# Patient Record
Sex: FEMALE | Race: WHITE | Employment: OTHER | URBAN - METROPOLITAN AREA
[De-identification: names, ages, dates, MRNs, and addresses within clinical notes are randomized per-mention and may not be internally consistent; named-entity substitution may affect disease eponyms.]

---

## 2018-11-09 ENCOUNTER — HOSPITAL ENCOUNTER (OUTPATIENT)
Dept: PHYSICAL THERAPY | Age: 83
Discharge: HOME | End: 2018-11-09
Payer: MEDICARE

## 2018-11-09 PROCEDURE — 97110 THERAPEUTIC EXERCISES: CPT | Performed by: PHYSICAL THERAPIST

## 2018-11-09 PROCEDURE — G8979 MOBILITY GOAL STATUS: HCPCS | Performed by: PHYSICAL THERAPIST

## 2018-11-09 PROCEDURE — G8978 MOBILITY CURRENT STATUS: HCPCS | Performed by: PHYSICAL THERAPIST

## 2018-11-09 PROCEDURE — 97162 PT EVAL MOD COMPLEX 30 MIN: CPT | Performed by: PHYSICAL THERAPIST

## 2018-11-09 NOTE — PROGRESS NOTES
[facility] [address] [place], [address]  
[phone] fax Plan of Care/ Statement of Necessity for Physical Therapy Services Patient name: Edilma Medina Start of Care: 2018 Referral source: Krystin Luis MD : 1931 Medical Diagnosis: Dizziness and giddiness [R42] Payor: Juan Jose Laguerre / Plan: [facility] / Product Type: Medicare /  Onset Date:18 Treatment Diagnosis: Balance dysfunction. Prior Hospitalization: see medical history Provider#: [id number] Medications: Verified on Patient summary List  
 Comorbidities: Hearing impaired, Cancer, HBP, Incontinence, Osteoporosis, Stoke/TIA, Surgery in 2017. Prior Level of Function: Prior to her last surgery she was walking independently. The Plan of Care and following information is based on the information from the initial evaluation. Assessment/ key information: Patient with signs and symptoms consistent with dizziness and balance difficulty. Patient ambulates with decreased jonas and cane for assistance. Her Timed Up and Go (TUG) was 20 seconds. Rhomberg stance for 30 seconds with eyes open and 20 seconds with eyes closed. She is unable to  tandem at all. UE/Le Strength is grossly 4/5 except her left arm is 3/5. Patient will benefit from a program of skilled physical therapy to include therapeutic exercises to address strength deficits, therapeutic activities to improve functional mobility, neuromuscular reeducation to address balance, coordination and proprioception, manual therapy to address ROM and tissue extensibility and modalities as indicated. All questions were answered.  
 
Evaluation Complexity History HIGH Complexity :3+ comorbidities / personal factors will impact the outcome/ POC ; Examination MEDIUM Complexity : 3 Standardized tests and measures addressing body structure, function, activity limitation and / or participation in recreation  ;Presentation MEDIUM Complexity : Evolving with changing characteristics  ; Clinical Decision Making MEDIUM Complexity : FOTO score of 26-74 Overall Complexity Rating: MEDIUM Problem List: decrease strength, impaired gait/ balance and decrease transfer abilities Treatment Plan may include any combination of the following: Therapeutic exercise, Therapeutic activities, Neuromuscular re-education, Gait/balance training and Manual therapy Patient / Family readiness to learn indicated by: asking questions, trying to perform skills and interest 
Persons(s) to be included in education: patient (P) Barriers to Learning/Limitations: yes;  sensory deficits-vision/hearing/speech Patient Goal (s): I would like to get rid of the dizziness.  Patient Self Reported Health Status: good Rehabilitation Potential: good Short Term Goals: To be accomplished in 1 weeks: 1. Patient will become proficient in her HEP and will be compliant in performing that program. 
 
Long Term Goals: To be accomplished in 8 weeks: 1. Improve TUG to below 18 seconds to improve functional mobility. 2.  Improve timed sit to stand to 10 repetitions in 30 seconds to improve transfers. 3.  Patient will be able to  tandem stance for 5-10 seconds to improve balance. 4.  Increase MMT for hip and knee general strength to 5/5. Frequency / Duration: Patient to be seen 2 times per week for 8 weeks. Patient/ Caregiver education and instruction: Diagnosis, prognosis, exercises 
 [x]  Plan of care has been reviewed with PTA 
 
G-Codes (GP) Mobility  Current  CK= 40-59%   Goal  CJ= 20-39% The severity rating is based on clinical judgment and the FOTO score. Certification Period: 11/9/2018 to  01/04/2019. Alex Avendaño, PT 11/9/2018 8:34 AM 
________________________________________________________________________ I certify that the above Therapy Services are being furnished while the patient is under my care. I agree with the treatment plan and certify that this therapy is necessary. [de-identified] Signature:____________Date:_________TIME:________ 
 
[employer], Date and Time must be completed for valid certification ** Please sign and return to [place] [id number] [address] [facility], [facility] [place]  
[phone] fax

## 2018-11-09 NOTE — PROGRESS NOTES
PT DAILY TREATMENT NOTE 10-18 Patient Name: Rory Andino Date:2018 : 1931 [x]  Patient  Verified Payor: [place] MEDICARE / Plan: Dino Banerjee / Product Type: Medicare / In time:8:03  Out time:8:51 Total Treatment Time (min): 48 Visit #: 1 of 18 Medicare/BCBS Only Total Timed Codes (min):  28 1:1 Treatment Time:  28 Treatment Area: Dizziness and giddiness [R42] SUBJECTIVE Pain Level (0-10 scale): 0/10 Any medication changes, allergies to medications, adverse drug reactions, diagnosis change, or new procedure performed?: [x] No    [] Yes (see summary sheet for update) Subjective functional status/changes:   [] No changes reported See Evaluation. OBJECTIVE 20 min [x]Eval                  []Re-Eval  
 
 
28 min Therapeutic Exercise:  [] See flow sheet :  
Rationale: increase ROM, increase strength, improve coordination, improve balance and increase proprioception to improve the patients ability to improve balance and ambulation safety. With 
 [] TE 
 [] TA 
 [] neuro 
 [] other: Patient Education: [x] Review HEP [] Progressed/Changed HEP based on:  
[] positioning   [] body mechanics   [] transfers   [] heat/ice application   
[] other:   
 
Other Objective/Functional Measures: See Evaluation. Pain Level (0-10 scale) post treatment: 0/10 ASSESSMENT/Changes in Function: Patient with signs and symptoms consistent with dizziness and balance difficulty. Patient ambulates with decreased jonas and cane for assistance. Her Timed Up and Go (TUG) was 20 seconds. Rhomberg stance for 30 seconds with eyes open and 20 seconds with eyes closed. She is unable to  tandem at all. UE/Le Strength is grossly 4/5 except her left arm is 3/5.   
 
Patient will continue to benefit from skilled PT services to modify and progress therapeutic interventions, address functional mobility deficits, address strength deficits, analyze and cue movement patterns, address imbalance/dizziness and instruct in home and community integration to attain remaining goals. [x]  See Plan of Care 
[]  See progress note/recertification 
[]  See Discharge Summary Progress towards goals / Updated goals: 
Short Term Goals: To be accomplished in 1 weeks: 1. Patient will become proficient in her HEP and will be compliant in performing that program. 
Evaluation:  Patient given a written/illustrated HEP. 
  
Long Term Goals: To be accomplished in 8 weeks: 1. Improve TUG to below 18 seconds to improve functional mobility. Evaluation:  20 seconds. 2.  Improve timed sit to stand to 10 repetitions in 30 seconds to improve transfers. Evaluation:  8 repetitions. 3.  Patient will be able to  tandem stance for 5-10 seconds to improve balance. Evaluation:  Unable to  tandem at all. 4.  Increase MMT for hip and knee general strength to 5/5. Evaluation:  UE/LE grossly 4/5 except right elbow and  3/5. PLAN [x]  Upgrade activities as tolerated     [x]  Continue plan of care 
[]  Update interventions per flow sheet      
[]  Discharge due to:_ 
[]  Other:_ Swathi Reese, PT 11/9/2018  9:03 AM 
 
Future Appointments Date Time Provider Leena Connor 11/12/2018 10:30 AM Power Montez, PT [facility]  
11/15/2018  8:00 AM Power Montez, PT [facility]  
11/20/2018  9:00 AM Maegan Mondragon, PT [facility]  
11/27/2018  8:30 AM Power Montez, PT [facility]  
11/29/2018  9:00 AM Maegan Mondragon, PT [facility]  
12/4/2018  8:00 AM [place], [address] [facility]  
12/6/2018  8:30 AM [place], [address] [facility]  
12/11/2018  8:00 AM [place], [address] [facility]  
12/13/2018  8:00 AM Nilo, [address] [facility]  
12/18/2018  8:00 AM [place], [address] [facility]  
12/20/2018  8:00 AM Power Montez, PT [facility]  
 8/1/2019  9:00 AM Burma Severin, MD [place]

## 2018-11-12 ENCOUNTER — HOSPITAL ENCOUNTER (OUTPATIENT)
Dept: PHYSICAL THERAPY | Age: 83
Discharge: HOME | End: 2018-11-12
Payer: MEDICARE

## 2018-11-12 PROCEDURE — 97112 NEUROMUSCULAR REEDUCATION: CPT

## 2018-11-12 PROCEDURE — 97110 THERAPEUTIC EXERCISES: CPT

## 2018-11-12 NOTE — PROGRESS NOTES
PT DAILY TREATMENT NOTE 10-18 Patient Name: Jacqueline Armenta Date:2018 : 1931 [x]  Patient  Verified Payor: [place] MEDICARE / Plan: Dino David y / Product Type: Medicare / In time:1030  Out time:1108 Total Treatment Time (min): 38 Visit #: 2 of 18 Medicare/BCBS Only Total Timed Codes (min):  38 1:1 Treatment Time:  45 Treatment Area: Dizziness and giddiness [R42] SUBJECTIVE Pain Level (0-10 scale): 5 Any medication changes, allergies to medications, adverse drug reactions, diagnosis change, or new procedure performed?: [x] No    [] Yes (see summary sheet for update) Subjective functional status/changes:   [] No changes reported Patient reports increased dizziness secondary to blood pressure medication. Patient denies vertiginous dizziness. Patient denies falls. OBJECTIVE 10 min Therapeutic Exercise:  [x] See flow sheet : Emphasis placed on improving available LE strength and ROM Rationale: increase ROM and increase strength to improve the patients ability to improve ease with household ADLs 28 min Neuromuscular Re-education:  [x]  See flow sheet : Emphasis placed on improving LE proprioceptive and kinesthetic awareness and improving static and dynamic stability Rationale: increase ROM, increase strength, improve balance and increase proprioception  to improve the patients ability to improve ease and safety with community ADLs With 
 [] TE 
 [] TA 
 [] neuro 
 [] other: Patient Education: [x] Review HEP [] Progressed/Changed HEP based on:  
[] positioning   [] body mechanics   [] transfers   [] heat/ice application   
[] other:   
 
BP: 144/86 mmHg Pain Level (0-10 scale) post treatment: 5 
 
ASSESSMENT/Changes in Function: Patient noted with greater [facility] deficits [facility] therefore question visual component to balance disturbances.  Patient educated to trial use of reading glasses with household mobility to assess impact on balance. Also question contribution of hearing loss to balance disturbances with patient with poor compliance with hearing aid usage. Patient will continue to benefit from skilled PT services to modify and progress therapeutic interventions, address functional mobility deficits, address ROM deficits, address strength deficits, analyze and address soft tissue restrictions and analyze and cue movement patterns to attain remaining goals. []  See Plan of Care 
[]  See progress note/recertification 
[]  See Discharge Summary Progress towards goals / Updated goals: 
 
Short Term Goals: To be accomplished in 1 weeks: 1.  Patient will become proficient in her HEP and will be compliant in performing that program. 
Evaluation:  Patient given a written/illustrated HEP. Current: Remains, HEP not initiated per patient subjective, 11/12/2018 
  
Long Term Goals: To be accomplished in 8 weeks: 1.  Improve TUG to below 18 seconds to improve functional mobility. Evaluation:  20 seconds. 2.  Improve timed sit to stand to 10 repetitions in 30 seconds to improve transfers. Evaluation:  8 repetitions. 3.  Patient will be able to  tandem stance for 5-10 seconds to improve balance. Evaluation:  Unable to  tandem at all. 4.  Increase MMT for hip and knee general strength to 5/5. Evaluation:  UE/LE grossly 4/5 except right elbow and  3/5. PLAN [x]  Upgrade activities as tolerated     [x]  Continue plan of care 
[]  Update interventions per flow sheet      
[]  Discharge due to:_ 
[]  Other:_   
 
Ming Ashford PT 11/12/2018  6:58 AM 
 
Future Appointments Date Time Provider Leena Connor 11/12/2018 10:30 AM Day Yee PT [facility]  
11/15/2018  8:00 AM Day Yee PT [facility]  
11/20/2018  9:00 AM Serafin Viera PT [facility]  
11/27/2018  8:30 AM Day Yee PT [facility]  
 11/29/2018  9:00 AM Maegan Mondragon, PT [facility]  
12/4/2018  8:00 AM Power Montez, PT [facility]  
12/6/2018  8:30 AM Power Montez, PT [facility]  
12/11/2018  8:00 AM [place], [address] [facility]  
12/13/2018  8:00 AM [place], [address] [facility]  
12/18/2018  8:00 AM Power Montez, PT [facility]  
12/20/2018  8:00 AM Power Montez, PT [facility]  
8/1/2019  9:00 [facility]

## 2018-11-15 ENCOUNTER — HOSPITAL ENCOUNTER (OUTPATIENT)
Dept: PHYSICAL THERAPY | Age: 83
Discharge: HOME | End: 2018-11-15
Payer: MEDICARE

## 2018-11-15 PROCEDURE — 97112 NEUROMUSCULAR REEDUCATION: CPT

## 2018-11-15 PROCEDURE — 97110 THERAPEUTIC EXERCISES: CPT

## 2018-11-15 NOTE — PROGRESS NOTES
PT DAILY TREATMENT NOTE 10-18 Patient Name: Reza Muro Date:11/15/2018 : 1931 [x]  Patient  Verified Payor: [place] MEDICARE / Plan: Dino Banerjee / Product Type: Medicare / In [id number]  Out [id number] Total Treatment Time (min): 32 Visit #: 3 of 18 Medicare/BCBS Only Total Timed Codes (min):  32 1:1 Treatment Time:  32 Treatment Area: Dizziness and giddiness [R42] SUBJECTIVE Pain Level (0-10 scale): 0 Any medication changes, allergies to medications, adverse drug reactions, diagnosis change, or new procedure performed?: [x] No    [] Yes (see summary sheet for update) Subjective functional status/changes:   [] No changes reported Patient reports trialing use of glasses and hearing aid at home with no impact on balance. OBJECTIVE 12 min Therapeutic Exercise:  [x] See flow sheet : Emphasis placed on improving available LE strength and ROM Rationale: increase ROM and increase strength to improve the patients ability to improve ease with household ADLs 
  
20 min Neuromuscular Re-education:  [x]  See flow sheet : Emphasis placed on improving LE proprioceptive and kinesthetic awareness and improving static and dynamic stability Rationale: increase ROM, increase strength, improve balance and increase proprioception  to improve the patients ability to improve ease and safety with community ADLs With 
 [] TE 
 [] TA 
 [] neuro 
 [] other: Patient Education: [x] Review HEP [] Progressed/Changed HEP based on:  
[] positioning   [] body mechanics   [] transfers   [] heat/ice application   
[] other:   
 
Pain Level (0-10 scale) post treatment: 0 
 
ASSESSMENT/Changes in Function: Significant improvement in tandem stance static stability bilaterally with progression of exercises to improve balance and stability statically and dynamically with maintenance of narrow base of support.  
 
Patient will continue to benefit from skilled PT services to modify and progress therapeutic interventions, address functional mobility deficits, address ROM deficits, address strength deficits, analyze and address soft tissue restrictions, analyze and cue movement patterns and analyze and modify body mechanics/ergonomics to attain remaining goals. []  See Plan of Care 
[]  See progress note/recertification 
[]  See Discharge Summary Progress towards goals / Updated goals: 
 
Short Term Goals: To be accomplished in 1 weeks: 1.  Patient will become proficient in her HEP and will be compliant in performing that program. 
Evaluation:  Patient given a written/illustrated HEP. Current: Progressing, HEP initiated without full compliance, 11/15/2018 
  
Long Term Goals: To be accomplished in 8 weeks: 1.  Improve TUG to below 18 seconds to improve functional mobility. Evaluation:  20 seconds. 2.  Improve timed sit to stand to 10 repetitions in 30 seconds to improve transfers. Evaluation:  8 repetitions. 3.  Patient will be able to  tandem stance for 5-10 seconds to improve balance. Evaluation:  Unable to  tandem at all. Current: Met, Sharpened Rhomberg (right LE forward, EO) = 20 sec, Sharpened Rhomberg (left LE forward, EO) = 12 sec, 11/15/2018 4.  Increase MMT for hip and knee general strength to 5/5. Evaluation:  UE/LE grossly 4/5 except right elbow and  3/5. PLAN [x]  Upgrade activities as tolerated     [x]  Continue plan of care 
[]  Update interventions per flow sheet      
[]  Discharge due to:_ 
[]  Other:_   
 
Abbe Carr PT 11/15/2018  7:50 AM 
 
Future Appointments Date Time Provider Leena Connor 11/15/2018  8:00 AM Trish Raya PT [facility]  
11/20/2018  9:00 AM ARNOL [facility]S [facility]  
11/27/2018  8:30 AM [facility]  
11/29/2018  9:00 AM ARNOL [facility]alesPTS [facility]  
12/4/2018  8:00 AM Paramjit [place] [facility]  
12/6/2018  8:30 AM Trish Raya PT [facility]  
 12/11/2018  8:00 AM Guillermina Dior, PT [facility] [address]  
12/13/2018  8:00 AM [place], [address]  
12/18/2018  8:00 AM Guillermina Dior, PT [facility] [address]  
12/20/2018  8:00 AM Guillermina Dior, PT [facility] [address]  
8/1/2019  9:00 AM MD Jeff oRsas

## 2018-11-20 ENCOUNTER — HOSPITAL ENCOUNTER (OUTPATIENT)
Dept: PHYSICAL THERAPY | Age: 83
Discharge: HOME | End: 2018-11-20
Payer: MEDICARE

## 2018-11-20 PROCEDURE — 97110 THERAPEUTIC EXERCISES: CPT | Performed by: PHYSICAL THERAPIST

## 2018-11-20 PROCEDURE — 97112 NEUROMUSCULAR REEDUCATION: CPT | Performed by: PHYSICAL THERAPIST

## 2018-11-20 NOTE — PROGRESS NOTES
PT DAILY TREATMENT NOTE 10-18 Patient Name: Rory Andino Date:2018 : 1931 [x]  Patient  Verified Payor: [place] MEDICARE / Plan: Dino Banerjee / Product Type: Medicare / In time:8:58  Out time:9:40 Total Treatment Time (min): 42 Visit #: 4 of 18 Medicare/BCBS Only Total Timed Codes (min):  42 1:1 Treatment Time:  30 Treatment Area: Dizziness and giddiness [R42] SUBJECTIVE Pain Level (0-10 scale): 5/10 Any medication changes, allergies to medications, adverse drug reactions, diagnosis change, or new procedure performed?: [x] No    [] Yes (see summary sheet for update) Subjective functional status/changes:   [] No changes reported Patient reports she lost her balance but did not fall, states her niece \"caught\" her, preventing her fall. OBJECTIVE 22 min Therapeutic Exercise:  [] See flow sheet :  
Rationale: increase ROM and increase strength to improve the patients ability to increase their functional activity level. 20 min Neuromuscular Re-education:  []  See flow sheet :  
Rationale: increase strength, improve coordination, improve balance and increase proprioception  to improve the patients ability to improve balance and ambulation. With 
 [] TE 
 [] TA 
 [] neuro 
 [] other: Patient Education: [x] Review HEP [] Progressed/Changed HEP based on:  
[] positioning   [] body mechanics   [] transfers   [] heat/ice application   
[] other:   
 
Other Objective/Functional Measures: Patient has fair static balance in Rhomberg posture with eyes open, she will waiver and use hand rails to maintain balance with eyes closed. Her balance is significantly diminished in tandem posture with eyes open or closed. Pain Level (0-10 scale) post treatment: 0/10 ASSESSMENT/Changes in Function: Patient continues with gait and balance challenges static and dynamic.  
 
Patient will continue to benefit from skilled PT services to modify and progress therapeutic interventions, address functional mobility deficits, address ROM deficits, address strength deficits, analyze and address soft tissue restrictions, analyze and cue movement patterns and analyze and modify body mechanics/ergonomics to attain remaining goals. [x]  See Plan of Care 
[]  See progress note/recertification 
[]  See Discharge Summary Progress towards goals / Updated goals: 
Short Term Goals: To be accomplished in 1 weeks: 1.  Patient will become proficient in her HEP and will be compliant in performing that program. 
Evaluation:  Patient given a written/illustrated HEP. Current: Progressing, HEP initiated without full compliance, 11/15/2018 
  
Long Term Goals: To be accomplished in 8 weeks: 1.  Improve TUG to below 18 seconds to improve functional mobility. Evaluation:  20 seconds. 2.  Improve timed sit to stand to 10 repetitions in 30 seconds to improve transfers. Evaluation:  8 repetitions. 3.  Patient will be able to  tandem stance for 5-10 seconds to improve balance. Evaluation:  Unable to  tandem at all. Current: Met, Sharpened Rhomberg (right LE forward, EO) = 20 sec, Sharpened Rhomberg (left LE forward, EO) = 12 sec, 11/15/2018 4.  Increase MMT for hip and knee general strength to 5/5. Evaluation:  UE/LE grossly 4/5 except right elbow and  3/5. PLAN [x]  Upgrade activities as tolerated     [x]  Continue plan of care 
[]  Update interventions per flow sheet      
[]  Discharge due to:_ 
[]  Other:_ Zahida Schneider PT 11/20/2018  9:19 AM 
 
Future Appointments Date Time Provider Leena Connor 11/27/2018  8:30 AM Froylan Holstein, PT [facility]  
11/29/2018  9:00 AM Kendra Sorensen PT [facility]  
12/4/2018  8:00 AM Froylan Holstein, PT [facility]  
12/6/2018  8:30 AM Froylan Holstein, PT [facility]  
12/11/2018  8:00 AM Paramjit [place]coran [facility]  
12/13/2018  8:00 AM Froylan Holstein, PT [facility]  
 12/18/2018  8:00 AM Natlai Mace PT [facility]  
12/20/2018  8:00 AM Natali Mace PT [facility]  
8/1/2019  9:00 AM Casi Hardy MD [id number] [facility]

## 2018-11-27 ENCOUNTER — HOSPITAL ENCOUNTER (OUTPATIENT)
Dept: PHYSICAL THERAPY | Age: 83
Discharge: HOME | End: 2018-11-27
Payer: MEDICARE

## 2018-11-27 PROCEDURE — 97110 THERAPEUTIC EXERCISES: CPT | Performed by: PHYSICAL THERAPIST

## 2018-11-27 PROCEDURE — G8979 MOBILITY GOAL STATUS: HCPCS | Performed by: PHYSICAL THERAPIST

## 2018-11-27 PROCEDURE — G8978 MOBILITY CURRENT STATUS: HCPCS | Performed by: PHYSICAL THERAPIST

## 2018-11-27 PROCEDURE — 97112 NEUROMUSCULAR REEDUCATION: CPT | Performed by: PHYSICAL THERAPIST

## 2018-11-27 NOTE — PROGRESS NOTES
[facility] [address] [facility] [place], [address] [place]  
[phone] fax Medicare Progress Report Patient name: Shannan Aguilar Start of Care: 2018 Referral source: Yissel Jarrett MD : 1931 Medical/Treatment Diagnosis: Dizziness and giddiness [R42] Payor: Connie Le / Plan: [facility] / Product Type: Medicare /  Onset Date:18 Prior Hospitalization: see medical history Provider#: [id number] Medications: Verified on Patient Summary List   
Comorbidities: Hearing impaired, Cancer, HBP, Incontinence, Osteoporosis, Stoke/TIA, Surgery in 2017. Prior Level of Function: Prior to her last surgery she was walking independently. Visits from Start of Care: 5    Missed Visits: 0 Reporting Period: 2018 to 2018 Subjective Reports: Patient continues to note fatigue and tiredness in her legs, at times she notes there is pain but mostly a tired sensation. Current Status/ treatment goals Objective measures 1. Improve TUG to below 18 seconds to improve functional mobility. [] met                 [] not met [x] progressing 19 seconds. 2. Improve timed sit to stand to 10 repetitions in 30 seconds to improve transfers. [] met                 [x] not met 
[] progressing 7 repetitions. 3. Patient will be able to  tandem stance for 5-10 seconds to improve balance. [x] met                 [] not met 
[] progressing Sharpened Rhomberg (right LE forward, EO) = 20 sec, Sharpened Rhomberg (left LE forward, EO) = 12 sec,   
4. Increase MMT for hip and knee general strength to 5/5. 
 
 
 [] met                 [] not met [x] progressing Bilat Hips 4/5; Bilat knees/ankles 5/5; Bilat shoulder 4/5; Bilat elbows 5/5;  3/5. Key functional changes: Patient has improved balance in tandem and Rhomberg stance with eyes open.   More of a challenge with eyes closed but she is able to make corrections as needed. Problems/ barriers to goal attainment: Fatigue Assessment / Recommendations:Patient has made modest improvements in TUG and strength on MMT. Her balance is improved in tandem and Rhomberg stances. She is more challenged with eyes closed which is to be expected but she is able to correct her balance as needed.  
Patient will continue to benefit from skilled PT services to modify and progress therapeutic interventions, address functional mobility deficits, address ROM deficits, address strength deficits, analyze and address soft tissue restrictions, analyze and cue movement patterns and analyze and modify body mechanics/ergonomics to attain remaining goals. Problem List: decrease strength, impaired gait/ balance, decrease ADL/ functional abilitiies, decrease activity tolerance and decrease transfer abilities Treatment Plan: Therapeutic exercise, Therapeutic activities, Neuromuscular re-education and Gait/balance training Patient Goal (s) has been updated and includes:  
Short Term Goals: To be accomplished in 1 weeks: 1.  Patient will become proficient in her HEP and will be compliant in performing that program. 
Evaluation:  Patient given a written/illustrated HEP. Current: Progressing, HEP initiated without full compliance, 11/27/2018 
  
Long Term Goals: To be accomplished in 8 weeks: 1.  Improve TUG to below 18 seconds to improve functional mobility. Evaluation:  20 seconds. Current:  19 seconds. 11/27/18. Progressing 2.  Improve timed sit to stand to 10 repetitions in 30 seconds to improve transfers. Evaluation:  8 repetitions. Current:  7 repetitions. 11/27/18. Not Met. 3.  Patient will be able to  tandem stance for 5-10 seconds to improve balance. Evaluation:  Unable to  tandem at all. Current: Met, Sharpened Rhomberg (right LE forward, EO) = 20 sec, Sharpened Rhomberg (left LE forward, EO) = 12 sec, 11/15/2018 4.  Increase MMT for hip and knee general strength to 5/5. Evaluation:  UE/LE grossly 4/5 except right elbow and  3/5. Current:  Bilat Hips 4/5; Bilat knees/ankles 5/5; Bilat shoulder 4/5; Bilat elbows 5/5;  3/5. 11/27/18. Progressing Updated Goals to be accomplished in 4 weeks: 1.  Patient will become proficient in her HEP and will be compliant in performing that program. 
Evaluation:  Patient given a written/illustrated HEP. PN:  HEP initiated without full compliance, 2.  Improve TUG to below 18 seconds to improve functional mobility. Evaluation:  20 seconds. PN:  19 seconds. 3.  Improve timed sit to stand to 10 repetitions in 30 seconds to improve transfers. Evaluation:  8 repetitions. PN:  7 repetitions. 4.  Increase MMT for hip and knee general strength to 5/5. Evaluation:  UE/LE grossly 4/5 except right elbow and  3/5. PN:  Bilat Hips 4/5; Bilat knees/ankles 5/5; Bilat shoulder 4/5; Bilat elbows 5/5;  3/5. Frequency / Duration: Patient to be seen 2 times per week for 4 weeks: 
 
G-Codes (GP) Mobility  Current  CK= 40-59%   Goal  CJ= 20-39% The severity rating is based on clinical judgment.  
 
Michael Means, PT 11/27/2018 8:51 AM

## 2018-11-27 NOTE — PROGRESS NOTES
PT DAILY TREATMENT NOTE 10-18 Patient Name: Fredis Packer Date:2018 : 1931 [x]  Patient  Verified Payor: [place] MEDICARE / Plan: Dino Stauffery / Product Type: Medicare / In time:8:17  Out time:9:00 Total Treatment Time (min): 43 Visit #: 5 of 18 Medicare/BCBS Only Total Timed Codes (min):  43 1:1 Treatment Time:  37 Treatment Area: Dizziness and giddiness [R42] SUBJECTIVE Pain Level (0-10 scale): 0-1/10 Any medication changes, allergies to medications, adverse drug reactions, diagnosis change, or new procedure performed?: [x] No    [] Yes (see summary sheet for update) Subjective functional status/changes:   [] No changes reported Patient reports she had a busy weekend and feels tired today. She notes she is moving a bit slower than normal. 
 
OBJECTIVE 25 min Therapeutic Exercise:  [] See flow sheet :  
Rationale: increase ROM and increase strength to improve the patients ability to increase their functional activity level. 18 min Neuromuscular Re-education:  []  See flow sheet :  
Rationale: improve coordination, improve balance and increase proprioception  to improve the patients ability to improve balance and ambulation. With 
 [] TE 
 [] TA 
 [] neuro 
 [] other: Patient Education: [x] Review HEP [] Progressed/Changed HEP based on:  
[] positioning   [] body mechanics   [] transfers   [] heat/ice application   
[] other:   
 
Other Objective/Functional Measures: TU Seconds. Sit to  30 seconds 7 repetitions. Bilat Hips 4/5; Bilat knees/ankles 5/5; Bilat shoulder 4/5; Bilat elbows 5/5;  3/5. Pain Level (0-10 scale) post treatment: 0/10. No pain, just tired. ASSESSMENT/Changes in Function: Patient has made modest improvements in TUG and strength on MMT. Her balance is improved in tandem and Rhomberg stances.   She is more challenged with eyes closed which is to be expected but she is able to correct her balance as needed. Patient will continue to benefit from skilled PT services to modify and progress therapeutic interventions, address functional mobility deficits, address ROM deficits, address strength deficits, analyze and address soft tissue restrictions, analyze and cue movement patterns and analyze and modify body mechanics/ergonomics to attain remaining goals. [x]  See Plan of Care 
[]  See progress note/recertification 
[]  See Discharge Summary Progress towards goals / Updated goals: 
Short Term Goals: To be accomplished in 1 weeks: 1.  Patient will become proficient in her HEP and will be compliant in performing that program. 
Evaluation:  Patient given a written/illustrated HEP. Current: Progressing, HEP initiated without full compliance, 11/27/2018 
  
Long Term Goals: To be accomplished in 8 weeks: 1.  Improve TUG to below 18 seconds to improve functional mobility. Evaluation:  20 seconds. Current:  19 seconds. 11/27/18. Progressing 2.  Improve timed sit to stand to 10 repetitions in 30 seconds to improve transfers. Evaluation:  8 repetitions. Current:  7 repetitions. 11/27/18. Not Met. 3.  Patient will be able to  tandem stance for 5-10 seconds to improve balance. Evaluation:  Unable to  tandem at all. Current: Met, Sharpened Rhomberg (right LE forward, EO) = 20 sec, Sharpened Rhomberg (left LE forward, EO) = 12 sec, 11/15/2018 4.  Increase MMT for hip and knee general strength to 5/5. Evaluation:  UE/LE grossly 4/5 except right elbow and  3/5. Current:  Bilat Hips 4/5; Bilat knees/ankles 5/5; Bilat shoulder 4/5; Bilat elbows 5/5;  3/5. 11/27/18. Progressing 
  
PLAN [x]  Upgrade activities as tolerated     [x]  Continue plan of care 
[]  Update interventions per flow sheet      
[]  Discharge due to:_ 
[]  Other:_ Barb Viramontes, PT 11/27/2018  8:25 AM 
 
Future Appointments Date Time Provider Leena Connor 11/27/2018  8:30 AM Nain Staley, PT [facility]  
11/29/2018  9:00 AM Nain Staley, PT [facility]  
12/11/2018  8:00 AM Gillian Deutsch, PT [facility]  
12/13/2018  8:00 AM Nilo [address] [facility]  
12/18/2018  8:00 AM Narciso Corcoran[address] [facility]  
12/20/2018  8:00 AM Gillian Deutsch, PT [facility]  
8/1/2019  9:00 AM Lyric Malin MD [facility] [facility]

## 2018-11-29 ENCOUNTER — HOSPITAL ENCOUNTER (OUTPATIENT)
Dept: PHYSICAL THERAPY | Age: 83
Discharge: HOME | End: 2018-11-29
Payer: MEDICARE

## 2018-11-29 PROCEDURE — 97110 THERAPEUTIC EXERCISES: CPT | Performed by: PHYSICAL THERAPIST

## 2018-11-29 PROCEDURE — 97112 NEUROMUSCULAR REEDUCATION: CPT | Performed by: PHYSICAL THERAPIST

## 2018-11-29 NOTE — PROGRESS NOTES
PT DAILY TREATMENT NOTE 10-18 Patient Name: Reza Muro Date:2018 : 1931 [x]  Patient  Verified Payor: [place] MEDICARE / Plan: Dino Banerjee / Product Type: Medicare / In time:8:49  Out time:9:00 Total Treatment Time (min): 41 Visit #: 6 of 18 Medicare/BCBS Only Total Timed Codes (min):  41 1:1 Treatment Time:  30 Treatment Area: Dizziness and giddiness [R42] SUBJECTIVE Pain Level (0-10 scale): 5/10 right hip Any medication changes, allergies to medications, adverse drug reactions, diagnosis change, or new procedure performed?: [x] No    [] Yes (see summary sheet for update) Subjective functional status/changes:   [] No changes reported Patient with c/o right hip pain today, notes it is an intermittent problem. She reports that she has improved balance and has not had any falls. 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 OBJECTIVE 23 min Therapeutic Exercise:  [] See flow sheet :  
Rationale: increase ROM and increase strength to improve the patients ability to increase their functional activity level. 18 min Neuromuscular Re-education:  []  See flow sheet :  
Rationale: increase strength, improve coordination and improve balance  to improve the patients ability to improve her balance and ambulation. With 
 [] TE 
 [] TA 
 [] neuro 
 [] other: Patient Education: [x] Review HEP [] Progressed/Changed HEP based on:  
[] positioning   [] body mechanics   [] transfers   [] heat/ice application   
[] other:   
 
Other Objective/Functional Measures: Patient is able to maneuver stairs with cane in a reciprocal manner. She has a decreased jonas but normal 3 point gait with cane. Pain Level (0-10 scale) post treatment: 5/10 right hip ASSESSMENT/Changes in Function: Patient has subjective improvement of her balance and gait. Patient will continue to benefit from skilled PT services to modify and progress therapeutic interventions, address functional mobility deficits, address ROM deficits, address strength deficits, analyze and address soft tissue restrictions, analyze and cue movement patterns and analyze and modify body mechanics/ergonomics to attain remaining goals. [x]  See Plan of Care 
[]  See progress note/recertification 
[]  See Discharge Summary Progress towards goals / Updated goals: 
Short Term Goals: To be accomplished in 1 weeks: 1.  Patient will become proficient in her HEP and will be compliant in performing that program. 
Evaluation:  Patient given a written/illustrated HEP. Current: Progressing, HEP initiated without full compliance, 11/27/2018 
  
Long Term Goals: To be accomplished in 8 weeks: 1.  Improve TUG to below 18 seconds to improve functional mobility. Evaluation:  20 seconds. Current:  19 seconds. 11/27/18. Progressing 2.  Improve timed sit to stand to 10 repetitions in 30 seconds to improve transfers. Evaluation:  8 repetitions. Current:  7 repetitions. 11/27/18. Not Met. 3.  Patient will be able to  tandem stance for 5-10 seconds to improve balance. Evaluation:  Unable to  tandem at all. Current: Met, Sharpened Rhomberg (right LE forward, EO) = 20 sec, Sharpened Rhomberg (left LE forward, EO) = 12 sec, 11/15/2018 4.  Increase MMT for hip and knee general strength to 5/5. Evaluation:  UE/LE grossly 4/5 except right elbow and  3/5. Current:  Bilat Hips 4/5; Bilat knees/ankles 5/5; Bilat shoulder 4/5; Bilat elbows 5/5;  3/5. 11/27/18. Progressing PLAN [x]  Upgrade activities as tolerated     [x]  Continue plan of care 
[]  Update interventions per flow sheet      
[]  Discharge due to:_ 
[]  Other:_ Karime Albrecht, PT 11/29/2018  9:32 AM 
 
Future Appointments Date Time Provider Leena Connor 12/11/2018  8:00 AM Ginger Clemons, PT [facility]  
12/13/2018  8:00 AM Nilo, [address] [facility]  
12/18/2018  8:00 AM Ginger Clemons, PT [facility]  
12/20/2018  8:00 AM Ginger Clemons, PT [facility]  
8/1/2019  9:00 AM Lili Nobles MD [facility]

## 2018-12-04 ENCOUNTER — APPOINTMENT (OUTPATIENT)
Dept: PHYSICAL THERAPY | Age: 83
End: 2018-12-04
Payer: MEDICARE

## 2018-12-06 ENCOUNTER — APPOINTMENT (OUTPATIENT)
Dept: PHYSICAL THERAPY | Age: 83
End: 2018-12-06
Payer: MEDICARE

## 2018-12-11 ENCOUNTER — HOSPITAL ENCOUNTER (OUTPATIENT)
Dept: PHYSICAL THERAPY | Age: 83
Discharge: HOME | End: 2018-12-11
Payer: MEDICARE

## 2018-12-11 PROCEDURE — 97110 THERAPEUTIC EXERCISES: CPT

## 2018-12-11 PROCEDURE — 97112 NEUROMUSCULAR REEDUCATION: CPT

## 2018-12-11 NOTE — PROGRESS NOTES
PT DAILY TREATMENT NOTE 10-18 Patient Name: Lianet Albrecht Date:2018 : 1931 [x]  Patient  Verified Payor: [place] MEDICARE / Plan: Dino Hernandez ingrid / Product Type: Medicare / In time:801  Out time:833 Total Treatment Time (min): 32 Visit #: 7 of 18 Medicare/BCBS Only Total Timed Codes (min):  32 1:1 Treatment Time:  32 Treatment Area: Dizziness and giddiness [R42] SUBJECTIVE Pain Level (0-10 scale): 1 Any medication changes, allergies to medications, adverse drug reactions, diagnosis change, or new procedure performed?: [x] No    [] Yes (see summary sheet for update) Subjective functional status/changes:   [] No changes reported Patient reports some lightheadedness without dizziness this AM. Patient reports continued complaint of generalized fatigue. OBJECTIVE 10 min Therapeutic Exercise:  [x] See flow sheet : Emphasis placed on improving available LE strength and ROM Rationale: increase ROM and increase strength to improve the patients ability to improve ease with household ADLs 
  
22 min Neuromuscular Re-education:  [x]  See flow sheet : Emphasis placed on improving LE proprioceptive and kinesthetic awareness and improving static and dynamic stability Rationale: increase ROM, increase strength, improve balance and increase proprioception  to improve the patients ability to improve ease and safety with community ADLs With 
 [] TE 
 [] TA 
 [] neuro 
 [] other: Patient Education: [x] Review HEP [] Progressed/Changed HEP based on:  
[] positioning   [] body mechanics   [] transfers   [] heat/ice application   
[] other:   
 
Pain Level (0-10 scale) post treatment: 5 
 
ASSESSMENT/Changes in Function: Significant improvement in stability with narrow base of support dynamically with supervision only required. Continued diminished balance demonstrated with absent/altered vision.  
 
Patient will continue to benefit from skilled PT services to modify and progress therapeutic interventions, address functional mobility deficits, address ROM deficits, address strength deficits, analyze and address soft tissue restrictions, analyze and cue movement patterns and analyze and modify body mechanics/ergonomics to attain remaining goals. []  See Plan of Care 
[]  See progress note/recertification 
[]  See Discharge Summary Progress towards goals / Updated goals: 
 
Short Term Goals: To be accomplished in 1 weeks: 1.  Patient will become proficient in her HEP and will be compliant in performing that program. 
Evaluation:  Patient given a written/illustrated HEP. Current: Progressing, HEP initiated without full compliance, 11/27/2018 
  
Long Term Goals: To be accomplished in 8 weeks: 1.  Improve TUG to below 18 seconds to improve functional mobility. Evaluation:  20 seconds. Current:  19 seconds.  11/27/18. Progressing 2.  Improve timed sit to stand to 10 repetitions in 30 seconds to improve transfers. Evaluation:  8 repetitions. Current: Regressing, 30-second-sit-to-stand = 7 repetitions, 12/11/2018 3.  Patient will be able to  tandem stance for 5-10 seconds to improve balance. Evaluation:  Unable to  tandem at all. Current: Met, Sharpened Rhomberg (right LE forward, EO) = 20 sec, Sharpened Rhomberg (left LE forward, EO) = 12 sec, 11/15/2018 4.  Increase MMT for hip and knee general strength to 5/5. Evaluation:  UE/LE grossly 4/5 except right elbow and  3/5. Current:  Bilat Hips 4/5; Bilat knees/ankles 5/5; Bilat shoulder 4/5; Bilat elbows 5/5;  3/5. 11/27/18. Progressing PLAN [x]  Upgrade activities as tolerated     [x]  Continue plan of care 
[]  Update interventions per flow sheet      
[]  Discharge due to:_ 
[]  Other:_   
 
Ming Ashford PT 12/11/2018  7:49 AM 
 
Future Appointments Date Time Provider Leena Connor 12/11/2018  8:00 AM Day Yee, PT [facility]  
 12/13/2018  8:00 AM Anisha Andersen, PT [facility]  
12/18/2018  8:00 AM Paramjit Corcoran N Carmela Perez [facility]  
12/20/2018  8:00 AM Anisha Andersen, PT [facility]  
8/1/2019  9:00 AM Sandoval Velez MD [address] [facility]

## 2018-12-13 ENCOUNTER — HOSPITAL ENCOUNTER (OUTPATIENT)
Dept: PHYSICAL THERAPY | Age: 83
Discharge: HOME | End: 2018-12-13
Payer: MEDICARE

## 2018-12-13 PROCEDURE — 97110 THERAPEUTIC EXERCISES: CPT

## 2018-12-13 PROCEDURE — 97112 NEUROMUSCULAR REEDUCATION: CPT

## 2018-12-13 NOTE — PROGRESS NOTES
PT DAILY TREATMENT NOTE 10-18    Patient Name: Jacqueline Armenta  Date:2018  : 1931  [x]  Patient  Verified  Payor: [place] MEDICARE / Plan: VA MEDICARE PART A & B / Product Type: Medicare /    In time:800  Out time:843  Total Treatment Time (min): 43  Visit #: 8 of 18    Medicare/BCBS Only   Total Timed Codes (min):  43 1:1 Treatment Time:  30       Treatment Area: Dizziness and giddiness [R42]    SUBJECTIVE  Pain Level (0-10 scale): 2  Any medication changes, allergies to medications, adverse drug reactions, diagnosis change, or new procedure performed?: [x] No    [] Yes (see summary sheet for update)  Subjective functional status/changes:   [] No changes reported  Patient reports self perception of improved activity tolerance. OBJECTIVE     20 min Therapeutic Exercise:  [x] See flow sheet : Emphasis placed on improving available LE strength and ROM   Rationale: increase ROM and increase strength to improve the patients ability to improve ease with household ADLs     23 min Neuromuscular Re-education:  [x]  See flow sheet : Emphasis placed on improving LE proprioceptive and kinesthetic awareness and improving static and dynamic stability   Rationale: increase ROM, increase strength, improve balance and increase proprioception  to improve the patients ability to improve ease and safety with community ADLs       With   [] TE   [] TA   [] neuro   [] other: Patient Education: [x] Review HEP    [] Progressed/Changed HEP based on:   [] positioning   [] body mechanics   [] transfers   [] heat/ice application    [] other:      Pain Level (0-10 scale) post treatment: 3    ASSESSMENT/Changes in Function: Improved dynamic stability demonstrated with narrow base of support and LE crossing. Patient educated to think over potential discharge after last two scheduled treatment sessions next week with continuation of prescribed HEP. Patient to let us know regarding comfort with discharge next session.     Patient will continue to benefit from skilled PT services to modify and progress therapeutic interventions, address functional mobility deficits, address ROM deficits, address strength deficits, analyze and address soft tissue restrictions and analyze and cue movement patterns to attain remaining goals. []  See Plan of Care  []  See progress note/recertification  []  See Discharge Summary         Progress towards goals / Updated goals:    Short Term Goals: To be accomplished in 1 weeks:  1.  Patient will become proficient in her HEP and will be compliant in performing that program.  Evaluation:  Patient given a written/illustrated HEP. Current: Progressing, HEP initiated without full compliance, 11/27/2018     Long Term Goals: To be accomplished in 8 weeks:  1.  Improve TUG to below 18 seconds to improve functional mobility. Evaluation:  20 seconds. Current:  19 seconds.  11/27/18. Progressing  2.  Improve timed sit to stand to 10 repetitions in 30 seconds to improve transfers. Evaluation:  8 repetitions. Current: Regressing, 30-second-sit-to-stand = 7 repetitions, 12/11/2018  3.  Patient will be able to  tandem stance for 5-10 seconds to improve balance. Evaluation:  Unable to  tandem at all. Current: Met, Sharpened Rhomberg (right LE forward, EO) = 20 sec, Sharpened Rhomberg (left LE forward, EO) = 12 sec, 11/15/2018  4.  Increase MMT for hip and knee general strength to 5/5. Evaluation:  UE/LE grossly 4/5 except right elbow and  3/5. Current:  Bilat Hips 4/5; Bilat knees/ankles 5/5; Bilat shoulder 4/5; Bilat elbows 5/5;  3/5. 11/27/18.  Progressing    PLAN  [x]  Upgrade activities as tolerated     [x]  Continue plan of care  []  Update interventions per flow sheet       []  Discharge due to:_  []  Other:_      Polo Durham, PT 12/13/2018  7:50 AM    Future Appointments   Date Time Provider Leena Connor   12/13/2018  8:00 AM Paramjit Corcoran [facility]   12/18/2018  8:00 AM Froylan Holstein, PT [facility]   12/20/2018  8:00 AM Froylan Holstein, PT [facility]   8/1/2019  9:00 AM MD Jeff Zamora Che

## 2018-12-17 ENCOUNTER — HOSPITAL ENCOUNTER (OUTPATIENT)
Dept: PHYSICAL THERAPY | Age: 83
Discharge: HOME | End: 2018-12-17
Payer: MEDICARE

## 2018-12-17 PROCEDURE — 97112 NEUROMUSCULAR REEDUCATION: CPT

## 2018-12-17 PROCEDURE — 97110 THERAPEUTIC EXERCISES: CPT

## 2018-12-17 PROCEDURE — G8979 MOBILITY GOAL STATUS: HCPCS

## 2018-12-17 PROCEDURE — G8980 MOBILITY D/C STATUS: HCPCS

## 2018-12-17 NOTE — PROGRESS NOTES
[facility]              [address]        [place], [address]   [phone] fax      Discharge Summary  Patient name: Lianet Albrecht Start of Care: 2018   Referral source: Rienaldo Naranjo MD : 1931   Medical/Treatment Diagnosis: Dizziness and giddiness [R42]  [place]or: [place] MED[place]ARE / Plan: [place] MEDICARE PART A & B / Product Type: Medicare /  Onset Date:2018     Prior Hospitalization: see medical history Provider#: [id number]   Medications: Verified on Patient Summary List    Comorbidities: Hearing impaired, Cancer, HBP, Incontinence, Osteoporosis, Stoke/TIA, Surgery in 2017. Prior Level of Function: Prior to her last surgery she was walking independently. Visits from Start of Care: 9    Missed Visits: 0  Reporting Period : 2018 to 2018      Summary of Care:    Short Term Goals: To be accomplished in 1 weeks:  1.  Patient will become proficient in her HEP and will be compliant in performing that program.  Evaluation:  Patient given a written/illustrated HEP. At DC: Progressing, HEP initiated without full compliance     Long Term Goals: To be accomplished in 8 weeks:  1.  Improve TUG to below 18 seconds to improve functional mobility. Evaluation:  20 seconds. At DC: Met, TUG (10 feet, no AD) = 16 seconds  2.  Improve timed sit to stand to 10 repetitions in 30 seconds to improve transfers. Evaluation:  8 repetitions. At DC: Met, 30-second-sit-to-stand = 11 repetitions, 2018  3.  Patient will be able to  tandem stance for 5-10 seconds to improve balance. Evaluation:  Unable to  tandem at all. At DC Met, Sharpened Rhomberg (right LE forward, EO) = 20 sec, Sharpened Rhomberg (left LE forward, EO) = 12 sec  4.  Increase MMT for hip and knee general strength to 5/5. Evaluation:  UE/LE grossly 4/5 except right elbow and  3/5.   At DC: Progressing, Bilat Hips 4+/5; Bilat knees/ankles 5/5    G-Codes (GP)  Mobility   [id number] Goal  CK= 40-59%  D/C  CK= 40-59%    The severity rating is based on clinical judgment and the FOTO Score score. ASSESSMENT/RECOMMENDATIONS:    At this time patient suitable for discharge with patient having demonstrated a significant functional improvement since SoC with significant reduction in TUG time and improvement in LE strength secondarily demonstrating a reduced falls risk. Patient subjectively reports self-perceived improvement in self-balance confidence with greater community access and reduced fear of falling.     [x]Discontinue therapy: [x]Patient has reached or is progressing toward set goals      []Patient is non-compliant or has abdicated      []Due to lack of appreciable progress towards set 55 Susan Da Silva, PT 12/17/2018 8:15 AM

## 2018-12-17 NOTE — PROGRESS NOTES
PT DAILY TREATMENT NOTE 10-18    Patient Name: Danyell Layton  Date:2018  : 1931  [x]  Patient  Verified  Payor: [place] MEDICARE / Plan: [place] MEDICARE PART A & B / Product Type: Medicare /    In time:757  Out time:833  Total Treatment Time (min): 36  Visit #: 9 of 18    Medicare/BCBS Only   Total Timed Codes (min):  36 1:1 Treatment Time:  36       Treatment Area: Dizziness and giddiness [R42]    SUBJECTIVE  Pain Level (0-10 scale): 3-4  Any medication changes, allergies to medications, adverse drug reactions, diagnosis change, or new procedure performed?: [x] No    [] Yes (see summary sheet for update)  Subjective functional status/changes:   [] No changes reported  Patient reports desire to be discharged at this time. OBJECTIVE    10 min Therapeutic Exercise:  [x] See flow sheet : Emphasis placed on improving available LE strength and ROM   Rationale: increase ROM and increase strength to improve the patients ability to improve ease with household ADLs     26 min Neuromuscular Re-education:  [x]  See flow sheet : Emphasis placed on improving LE proprioceptive and kinesthetic awareness and improving static and dynamic stability   Rationale: increase ROM, increase strength, improve balance and increase proprioception  to improve the patients ability to improve ease and safety with community ADLs        With   [] TE   [] TA   [] neuro   [] other: Patient Education: [x] Review HEP    [] Progressed/Changed HEP based on:   [] positioning   [] body mechanics   [] transfers   [] heat/ice application    [] other:      Pain Level (0-10 scale) post treatment: 4    ASSESSMENT/Changes in Function: At this time patient suitable for discharge with patient having demonstrated a significant functional improvement since SoC with significant reduction in TUG time and improvement in LE strength secondarily demonstrating a reduced falls risk.  Patient subjectively reports self-perceived improvement in self-balance confidence with greater community access and reduced fear of falling. []  See Plan of Care  []  See progress note/recertification  [x]  See Discharge Summary         Progress towards goals / Updated goals:    Short Term Goals: To be accomplished in 1 weeks:  1.  Patient will become proficient in her HEP and will be compliant in performing that program.  Evaluation:  Patient given a written/illustrated HEP. Current: Progressing, HEP initiated without full compliance, 12/17/2018     Long Term Goals: To be accomplished in 8 weeks:  1.  Improve TUG to below 18 seconds to improve functional mobility. Evaluation:  20 seconds. Current: Met, TUG (10 feet, no AD) = 16 seconds, 12/17/2018  2.  Improve timed sit to stand to 10 repetitions in 30 seconds to improve transfers. Evaluation:  8 repetitions. Current: Met, 30-second-sit-to-stand = 11 repetitions, 12/17/2018  3.  Patient will be able to  tandem stance for 5-10 seconds to improve balance. Evaluation:  Unable to  tandem at all. Current: Met, Sharpened Rhomberg (right LE forward, EO) = 20 sec, Sharpened Rhomberg (left LE forward, EO) = 12 sec, 11/15/2018  4.  Increase MMT for hip and knee general strength to 5/5. Evaluation:  UE/LE grossly 4/5 except right elbow and  3/5.   Current: Progressing, Bilat Hips 4+/5; Bilat knees/ankles 5/5, 12/17/2018    PLAN  []  Upgrade activities as tolerated     []  Continue plan of care  []  Update interventions per flow sheet       [x]  Discharge due to:_  []  Other:_      Lucretia Stoddard PT 12/17/2018  7:55 AM    Future Appointments   Date Time Provider Leena Connor   12/17/2018  8:00 AM Yumiko Mcgraw, PT [facility]   8/1/2019  9:00 AM Mike Zelaya MD [id number] [facility]

## 2018-12-18 ENCOUNTER — APPOINTMENT (OUTPATIENT)
Dept: PHYSICAL THERAPY | Age: 83
End: 2018-12-18
Payer: MEDICARE

## 2018-12-20 ENCOUNTER — APPOINTMENT (OUTPATIENT)
Dept: PHYSICAL THERAPY | Age: 83
End: 2018-12-20
Payer: MEDICARE